# Patient Record
Sex: FEMALE | Race: OTHER | Employment: UNEMPLOYED | URBAN - METROPOLITAN AREA
[De-identification: names, ages, dates, MRNs, and addresses within clinical notes are randomized per-mention and may not be internally consistent; named-entity substitution may affect disease eponyms.]

---

## 2023-08-13 ENCOUNTER — HOSPITAL ENCOUNTER (EMERGENCY)
Age: 48
Discharge: HOME | End: 2023-08-13
Attending: EMERGENCY MEDICINE

## 2023-08-13 VITALS
OXYGEN SATURATION: 99 % | WEIGHT: 167.55 LBS | HEIGHT: 61 IN | HEART RATE: 74 BPM | RESPIRATION RATE: 18 BRPM | BODY MASS INDEX: 31.63 KG/M2 | DIASTOLIC BLOOD PRESSURE: 71 MMHG | TEMPERATURE: 97.6 F | SYSTOLIC BLOOD PRESSURE: 104 MMHG

## 2023-08-13 DIAGNOSIS — L08.9 TOE INFECTION: Primary | ICD-10-CM

## 2023-08-13 DIAGNOSIS — M79.674 TOE PAIN, RIGHT: ICD-10-CM

## 2023-08-13 PROCEDURE — 99283 EMERGENCY DEPT VISIT LOW MDM: CPT

## 2023-08-13 PROCEDURE — 6370000000 HC RX 637 (ALT 250 FOR IP)

## 2023-08-13 RX ORDER — HYDROCODONE BITARTRATE AND ACETAMINOPHEN 7.5; 325 MG/1; MG/1
1 TABLET ORAL EVERY 8 HOURS PRN
Qty: 12 TABLET | Refills: 0 | Status: SHIPPED | OUTPATIENT
Start: 2023-08-13 | End: 2023-08-17

## 2023-08-13 RX ORDER — SULFAMETHOXAZOLE AND TRIMETHOPRIM 800; 160 MG/1; MG/1
1 TABLET ORAL 2 TIMES DAILY
Qty: 14 TABLET | Refills: 0 | Status: SHIPPED | OUTPATIENT
Start: 2023-08-13 | End: 2023-08-20

## 2023-08-13 RX ORDER — HYDROCODONE BITARTRATE AND ACETAMINOPHEN 5; 325 MG/1; MG/1
1 TABLET ORAL
Status: COMPLETED | OUTPATIENT
Start: 2023-08-13 | End: 2023-08-13

## 2023-08-13 RX ADMIN — HYDROCODONE BITARTRATE AND ACETAMINOPHEN 1 TABLET: 5; 325 TABLET ORAL at 09:25

## 2023-08-13 ASSESSMENT — PAIN DESCRIPTION - LOCATION: LOCATION: TOE (COMMENT WHICH ONE)

## 2023-08-13 ASSESSMENT — PAIN DESCRIPTION - ORIENTATION: ORIENTATION: RIGHT

## 2023-08-13 ASSESSMENT — PAIN SCALES - GENERAL
PAINLEVEL_OUTOF10: 9
PAINLEVEL_OUTOF10: 5
PAINLEVEL_OUTOF10: 10

## 2023-08-13 ASSESSMENT — PAIN - FUNCTIONAL ASSESSMENT
PAIN_FUNCTIONAL_ASSESSMENT: 0-10
PAIN_FUNCTIONAL_ASSESSMENT: 0-10

## 2023-08-13 NOTE — ED PROVIDER NOTES
Emergency Department Provider Note       PCP: No primary care provider on file. Age: 56 y.o. Sex: female     DISPOSITION Decision To Discharge 08/13/2023 09:41:45 AM       ICD-10-CM    1. Toe infection  L08.9 JOHNNIE - Yudy Waddell DPM, [facility]      2. Toe pain, right  M79.674 HYDROcodone-acetaminophen (NORCO) 7.5-325 MG per tablet          Medical Decision Making     Complexity of Problems Addressed:  1 acute problem    Data Reviewed and Analyzed:   I independently ordered and reviewed each unique test.             Discussion of management or test interpretation. Patient presented with complaint of painful area of the nailbed of the right great toe that resulted after she pulled a piece of skin or a piece of her toenail off of her nailbed. On examination there is what appears to be what looks like a felon. I was unable to express any purulent drainage from area of complaint. The skin at the area of complaint is firm and not fluctuant. There is some surrounding purulence however nothing was able to be expressed out of the area of complaint. Will begin on outpatient course of Bactrim and will provide outpatient referral to podiatrist for close follow-up. Also advised to soak foot in Epsom salts to soften the skin tissue surrounding the area of complaint for improvement in symptoms. Risk of Complications and/or Morbidity of Patient Management:  Patient was discharged risks and benefits of hospitalization were considered. Shared medical decision making was utilized in creating the patients health plan today. Is this patient to be included in the SEP-1 core measure due to severe sepsis or septic shock? No Exclusion criteria - the patient is NOT to be included for SEP-1 Core Measure due to:  Infection is not suspected      History      Jacqueline Pina is a 56 y.o. female who presents to the Emergency Department with chief complaint of    Chief